# Patient Record
(demographics unavailable — no encounter records)

---

## 2024-10-21 NOTE — ASSESSMENT
[FreeTextEntry1] : 58 y/o M w/ PMH of R hernia repair, TIA, and cataract surgery presenting for NPA after recent ED visit at [facility].  #C/f malignancy of sigmoid colon -Referral made to GI -Ensured patient's insurance accepted at provider he was referred to -Will obtain BMP to assess for electrolyte abnormalities associated with diarrhea and vomiting -If patient's colonic thickening is confirmed to be cancer on biopsy, will refer to heme/onc  #Bilateral inguinal hernia -Not incarcerated, but increasingly painful and tender -Will refer to general surgery for repair  #HCM -CBC, CMP, A1C, lipids ordered. Will follow-up -Referred to GI for colonoscopy -Further HCM at subsequent visits -Return to clinic in 5 weeks

## 2024-10-21 NOTE — ASSESSMENT
[FreeTextEntry1] : 56 y/o M w/ PMH of R hernia repair, TIA, and cataract surgery presenting for NPA after recent ED visit at [facility].  #C/f malignancy of sigmoid colon -Referral made to GI -Ensured patient's insurance accepted at provider he was referred to -Will obtain BMP to assess for electrolyte abnormalities associated with diarrhea and vomiting -If patient's colonic thickening is confirmed to be cancer on biopsy, will refer to heme/onc  #Bilateral inguinal hernia -Not incarcerated, but increasingly painful and tender -Will refer to general surgery for repair  #HCM -CBC, CMP, A1C, lipids ordered. Will follow-up -Referred to GI for colonoscopy -Further HCM at subsequent visits -Return to clinic in 5 weeks

## 2024-10-21 NOTE — PHYSICAL EXAM
[No Acute Distress] : no acute distress [Normal Sclera/Conjunctiva] : normal sclera/conjunctiva [EOMI] : extraocular movements intact [Normal Outer Ear/Nose] : the outer ears and nose were normal in appearance [No Respiratory Distress] : no respiratory distress  [Clear to Auscultation] : lungs were clear to auscultation bilaterally [Normal Rate] : normal rate  [Regular Rhythm] : with a regular rhythm [No Murmur] : no murmur heard [No Edema] : there was no peripheral edema [Grossly Normal Strength/Tone] : grossly normal strength/tone [No Rash] : no rash [Coordination Grossly Intact] : coordination grossly intact [Normal Affect] : the affect was normal [Alert and Oriented x3] : oriented to person, place, and time [de-identified] : Visible R inguinal hernia unable to tolerate reduction due to pain, smaller palpable left inguinal hernia

## 2024-10-21 NOTE — REVIEW OF SYSTEMS
[Night Sweats] : night sweats [Recent Change In Weight] : ~T recent weight change [Cough] : cough [Abdominal Pain] : abdominal pain [Nausea] : nausea [Diarrhea] : diarrhea [Vomiting] : vomiting [Negative] : Heme/Lymph

## 2024-10-21 NOTE — HEALTH RISK ASSESSMENT
[Fair] :  ~his/her~ mood as fair [Yes] : Yes [Monthly or less (1 pt)] : Monthly or less (1 point) [Never (0 pts)] : Never (0 points) [1] : 2) Feeling down, depressed, or hopeless for several days (1) [Never] : Never [With Significant Other] : lives with significant other [Employed] : employed [] :  [PHQ-2 Positive] : PHQ-2 Positive [PHQ-9 Negative - No further assessment needed] : PHQ-9 Negative - No further assessment needed [[id number]] : 2

## 2024-10-21 NOTE — HEALTH RISK ASSESSMENT
[Fair] :  ~his/her~ mood as fair [Yes] : Yes [Monthly or less (1 pt)] : Monthly or less (1 point) [Never (0 pts)] : Never (0 points) [1] : 2) Feeling down, depressed, or hopeless for several days (1) [Never] : Never [With Significant Other] : lives with significant other [Employed] : employed [] :  [PHQ-2 Positive] : PHQ-2 Positive [PHQ-9 Negative - No further assessment needed] : PHQ-9 Negative - No further assessment needed [[id number]] : 2

## 2024-10-21 NOTE — HISTORY OF PRESENT ILLNESS
[FreeTextEntry1] : Bilateral hernias, evaluation for colon cancer [de-identified] : 58 y/o M w/ PMH of R hernia repair, TIA, and cataract surgery presenting for NPA after recent ED visit at [facility]. Seen for diarrhea and cough. Abdominal CT showing thickening of sigmoid colon c/w malignancy. Outpatient referrals made for GI, onc, and gen surg (for bilateral inguinal hernia).  Patient states that over the last 6 months, he has had constant diarrhea. The diarrhea is watery and occurs both at night (wakes him 2-3 times per night) and after eating (approximately 5-10 mins after every meal). He has daily nausea and vomiting (typically 1 episode every morning) and recently had night sweats. He also has a constant cough over the last 3 months that was productive of phlegm initially but is now dry. These symptoms caused him to present to the ED where he was found to have thickening of sigmoid colon, suspicious for malignancy. He now presents to clinic to establish care and obtain referrals to see GI. Patient also has bilateral inguinal hernias R>L, non-incarcerated, but which cause him great discomfort. He has had hernia surgery on the left side. Today, he requests a referral for general surgery for hernia repair.

## 2024-10-21 NOTE — HISTORY OF PRESENT ILLNESS
[FreeTextEntry1] : Bilateral hernias, evaluation for colon cancer [de-identified] : 56 y/o M w/ PMH of R hernia repair, TIA, and cataract surgery presenting for NPA after recent ED visit at [facility]. Seen for diarrhea and cough. Abdominal CT showing thickening of sigmoid colon c/w malignancy. Outpatient referrals made for GI, onc, and gen surg (for bilateral inguinal hernia).  Patient states that over the last 6 months, he has had constant diarrhea. The diarrhea is watery and occurs both at night (wakes him 2-3 times per night) and after eating (approximately 5-10 mins after every meal). He has daily nausea and vomiting (typically 1 episode every morning) and recently had night sweats. He also has a constant cough over the last 3 months that was productive of phlegm initially but is now dry. These symptoms caused him to present to the ED where he was found to have thickening of sigmoid colon, suspicious for malignancy. He now presents to clinic to establish care and obtain referrals to see GI. Patient also has bilateral inguinal hernias R>L, non-incarcerated, but which cause him great discomfort. He has had hernia surgery on the left side. Today, he requests a referral for general surgery for hernia repair.

## 2024-10-21 NOTE — PHYSICAL EXAM
[No Acute Distress] : no acute distress [Normal Sclera/Conjunctiva] : normal sclera/conjunctiva [EOMI] : extraocular movements intact [Normal Outer Ear/Nose] : the outer ears and nose were normal in appearance [No Respiratory Distress] : no respiratory distress  [Clear to Auscultation] : lungs were clear to auscultation bilaterally [Normal Rate] : normal rate  [Regular Rhythm] : with a regular rhythm [No Murmur] : no murmur heard [No Edema] : there was no peripheral edema [Grossly Normal Strength/Tone] : grossly normal strength/tone [No Rash] : no rash [Coordination Grossly Intact] : coordination grossly intact [Normal Affect] : the affect was normal [Alert and Oriented x3] : oriented to person, place, and time [de-identified] : Visible R inguinal hernia unable to tolerate reduction due to pain, smaller palpable left inguinal hernia

## 2024-11-11 NOTE — ASSESSMENT
[FreeTextEntry1] : Mr. Sheldon is a 58yo M with a PMHx of TIA and cataracts and a PSHx of cataract surgery and L inguinal hernia repair (2021). Patient comes to clinic today for evaluation of bilateral inguinal hernias. On exam patient has b/l inguinal hernias that are easily reducible but very tender to palpation. No overlying skin changes and no history of symptoms concerning for obstruction.

## 2024-11-11 NOTE — PHYSICAL EXAM
[Alert] : alert [Oriented to Person] : oriented to person [Oriented to Place] : oriented to place [Oriented to Time] : oriented to time [Calm] : calm [de-identified] : Well nourished, in no acute distress [de-identified] : No increased WOB, no tachypnea  [de-identified] : Soft, nondistended abdomen. Nontender to palpation in b/l upper quadrants. Tender to palpation in b/l lower quadrants.  L groin with well healed surgical incision, palpable inguinal hernia, easily reducible with palpable defect. No swelling or overlying skin changes.  R groin with visible bulge. Hernia is easily reducible with palpable defect. Tender to palpation but no overlying skin changes.

## 2024-11-11 NOTE — PLAN
[FreeTextEntry1] : Plan: Due to findings of irregular sigmoid colon thickening and symptoms of bloody diarrhea and weight loss repair of hernias deferred until after GI workup. Patient advised to follow up with GI on 11/26 for colonoscopy and further evaluation of CT findings prior to hernia repair. Patient declined supportive hernia truss.

## 2024-11-11 NOTE — HISTORY OF PRESENT ILLNESS
[de-identified] : Mr. Sheldon is a 58yo M with a PMHx of TIA and cataracts and a PSHx of cataract surgery and L inguinal hernia repair (2021). Patient comes to clinic today for evaluation of bilateral inguinal hernias. Patient states that 3months ago he began to notice a bulge in his R groin and pain in his R and left groin. For the last 3months pain has been increasing and is now rated a 10/10 in intensity. Pain radiates to b/l lower quadrants. The pain has become very intrusive in his day to day life, he has difficulty walking and states that the hernia increases in size after he eats and has caused him to skip meals occasionally for the last month. He also has had chronic diarrhea for 1mo with occasional dark blood in his stools associated with a 10lb weight loss in the last month. Denies any recent episodes of obstipation, generalized abdominal pain, skin changes over the hernias, or vomiting.   Of note, patient was recently seen in the [facility] ED for persistent cough, diarrhea and evaluatoin of the R inguinal hernia. During this ED visit CT abdomen was performed which showed irregular wall thickening of the distal sigmoid colon, suspicious for a neoplasm. Patient has follow up with GI on 11/26 for further evaluation.

## 2024-11-11 NOTE — END OF VISIT
[] : Resident [FreeTextEntry3] : I have seen and examined this patient with the resident housestaff. I have reviewed all labs, imaging and reports. I have participated in formulating the plan, and have read and agree with the history, ROS, exam, assessment and plan as stated above.    58 yo M with symptomatic bilateral inguinal hernias, easily reducible. Patient's history and imaging are concerning for possible colon malignancy. He will see GI on 11/26. Recommend delaying surgical repair of hernias until malignancy work up complete.      Total time spent in the care of this patient today (excluding critical care, teaching, & procedures): 30 minutes                  Over 50% of the total time was spent on counseling and coordination of care.

## 2024-11-11 NOTE — REASON FOR VISIT
[Initial Eval - Existing Diagnosis] : an initial evaluation of an existing diagnosis [Spouse] : spouse [FreeTextEntry1] :  Initial evaluation for bilateral inguinal hernias

## 2024-12-02 NOTE — END OF VISIT
[] : Fellow [FreeTextEntry3] : As modified and discussed with patient MD SLAVA Garcia [facility] Associate Professor of Medicine [facility]   [Time Spent: ___ minutes] : I have spent [unfilled] minutes of time on the encounter which excludes teaching and separately reported services.

## 2024-12-02 NOTE — PHYSICAL EXAM
[Normal] : heart rate was normal and rhythm regular, normal S1 and S2, no murmurs [No Masses] : no abdominal mass palpated [Abdomen Soft] : soft [Abnormal Walk] : normal gait [No Clubbing, Cyanosis] : no clubbing or cyanosis of the fingernails [No Joint Swelling] : no joint swelling seen [Normal Color / Pigmentation] : normal skin color and pigmentation [] : no rash [Oriented To Time, Place, And Person] : oriented to person, place, and time [Normal Affect] : the affect was normal [de-identified] : Diffuse abdominal tenderness [de-identified] : Performed with Dr. Bazzi in room; (+) skin tag, no active bleeding. Brown stool in vault

## 2024-12-02 NOTE — PHYSICAL EXAM
[Normal] : heart rate was normal and rhythm regular, normal S1 and S2, no murmurs [No Masses] : no abdominal mass palpated [Abdomen Soft] : soft [Abnormal Walk] : normal gait [No Clubbing, Cyanosis] : no clubbing or cyanosis of the fingernails [No Joint Swelling] : no joint swelling seen [Normal Color / Pigmentation] : normal skin color and pigmentation [] : no rash [Oriented To Time, Place, And Person] : oriented to person, place, and time [Normal Affect] : the affect was normal [de-identified] : Diffuse abdominal tenderness [de-identified] : Performed with Dr. Bazzi in room; (+) skin tag, no active bleeding. Brown stool in vault

## 2024-12-02 NOTE — REVIEW OF SYSTEMS
[Recent Weight Loss (___ Lbs)] : recent [unfilled] ~Ulb weight loss [As Noted in HPI] : as noted in HPI [Itching] : itching [Negative] : Neurological [Fever] : no fever [Chills] : no chills [Eye Pain] : no eye pain [Arthralgias (joint pain)] : no arthralgias [Joint Swelling] : no joint swelling [Limb Swelling] : no limb swelling [Skin Lesions] : no skin lesions [de-identified] : LE itching

## 2024-12-02 NOTE — END OF VISIT
Rosario Alford  : 1968 [facility] [address]  [phone]   [phone]       OUTPATIENT PHYSICAL THERAPY: Daily Note 2017    ICD-10: Treatment Diagnosis: Cervicalgia (M54.2)   Precautions/Allergies:   Review of patient's allergies indicates no known allergies. Fall Risk Score: 0 (? 5 = High Risk)  MD Orders: Eval and Treat  MEDICAL/REFERRING DIAGNOSIS:  Other cervical disc degeneration, unspecified cervical region [M50.30]  Radiculopathy, cervical region [M54.12]   DATE OF ONSET: May 10, 2017  REFERRING PHYSICIAN: Irving Hampton MD  RETURN PHYSICIAN APPOINTMENT: TBD by patient      INITIAL ASSESSMENT:  Ms. Rosario Alford has attended 1 physical therapy session including initial evaluation. Rosario Alford presents with S/S of increased pain, decreased ROM, decreased strength, decreased functional tolerance consistent with S/S of median nerve radiculopathy. Rosario Alford will benefit from home exercise program, therapeutic and postural strengthening exercises, manual therapeutic techniques (ie. Distraction, SOR, myofascial release/soft tissue mobilization) as appropriate to address Osvaldo Ornelas's current condition. Rosario Alford will benefit from skilled PT (medically necessary) to address above deficits affecting participation in basic ADLs and overall functional tolerance. PROBLEM LIST (Impacting functional limitations):  1. Decreased Strength  2. Decreased ADL/Functional Activities  3. Decreased Transfer Abilities  4. Decreased Ambulation Ability/Technique  5. Decreased Balance  6. Increased Pain  7. Decreased Work Simplification/Energy Conservation Techniques  8. Decreased Flexibility/Joint Mobility  9. Decreased [place] with Home Exercise Program INTERVENTIONS PLANNED:  1. Balance Exercise  2. Bed Mobility  3. Cold  4. Cryotherapy  5. Heat  6. Home Exercise Program (HEP)  7. Manual Therapy  8.  Neuromuscular Re-education/Strengthening  9. Range of Motion (ROM)  10. Therapeutic Activites  11. Therapeutic Exercise/Strengthening  12. Transfer Training   TREATMENT PLAN:  Effective Dates: 7/17/2017 TO 10/17/2017. Frequency/Duration: 2 times a week for 8 weeks  GOALS: (Goals have been discussed and agreed upon with patient.)  SHORT-TERM FUNCTIONAL GOALS: Time Frame: 4 weeks  1. Russell Lindo will report <=5/10 pain to cervical spine with performance of functional spinal mobility and rotation. 2.  Russell Lindo will demonstrate improved NDI score, indicating spinal improvement from 35/50 to 25/50 affecting minimal to no difficulty with performance of cervical mobility and strengthening. 3.  Gailamador Chavezer to be independent with initial HEP for cervical region and UE's.   4.  Gail Prymer will improve ROM to >=90% to assist with improved function during instrumental activities of daily living. 5.  Russell Lindo will improve MMT to >=4+/5 to all UE strengthening to return to PLOF and improve functional tolerance. DISCHARGE GOALS: Time Frame: 8 weeks  1. Russell Lindo will report <=2/10 pain to cervical spine with performance of functional spinal mobility and rotation and minimal to no difficulty with such tasks. 2. Russell Lindo will demonstrate improved NDI score, indicating spinal improvement from 25/50 to 15/50 affecting minimal to no difficulty with performance of cervical mobility and strengthening. 3. Gail Prymer to be independent with advanced HEP for cervical region and UE's. Rehabilitation Potential For Stated Goals: Good  Regarding Gailamador Ornelas's therapy, I certify that the treatment plan above will be carried out by a therapist or under their direction.   Thank you for this referral,  Augustine Tee PT     Referring Physician Signature: MariaG Wright MD              Date                    HISTORY:   History of Present Injury/Illness (Reason for Referral):  Ms. Carolyn Botello reports onset for left [] : Fellow [FreeTextEntry3] : As modified and discussed with patient MD SLAVA Garcia [facility] Associate Professor of Medicine [facility]   sided neck and arm pain that has disturbed sleep, bathing, dressing, job duties, housework, cooking, eating, sitting, and driving. She reports that her symptoms are occasional sharp, throbbing, twinge, ache, numbness (in the middle three fingers on left hand), tingling (occasional), tight, pulling. Past Medical History/Comorbidities:   Ms. Gwendolyn Dowell  has a past medical history of Anxiety; Arthritis; Chronic obstructive pulmonary disease ([facility]); Chronic pain; Contact dermatitis and other eczema, due to unspecified cause; Diabetes ([facility]); Hypercholesterolemia; Hypertension; and Trauma. Ms. Gwendolyn Dowell  has a past surgical history that includes  section and myomectomy. Social History/Living Environment:     lives alone in an apartment  Prior Level of Function/Work/Activity:    Personal Factors:          Social Background:  Recently moved from another part of the [place]    Current Medications:    Current Outpatient Prescriptions:     fluconazole (DIFLUCAN) 100 mg tablet, One a day for 5 days for yeast infection, Disp: 5 Tab, Rfl: 0    aspirin delayed-release 81 mg tablet, Take  by mouth daily. , Disp: , Rfl:     atenolol (TENORMIN) 25 mg tablet, Take 25 mg by mouth daily. , Disp: , Rfl:     gabapentin (NEURONTIN) 100 mg capsule, One capsule three times a day for nerve pain, Disp: 90 Cap, Rfl: 1    doxepin (SINEQUAN) 10 mg capsule, Take 1 Cap by mouth nightly., Disp: 30 Cap, Rfl: 0    simvastatin (ZOCOR) 20 mg tablet, Take 1 Tab by mouth nightly., Disp: 90 Tab, Rfl: 3    meloxicam (MOBIC) 15 mg tablet, Take 1 Tab by mouth daily. , Disp: 30 Tab, Rfl: 6     Date Last Reviewed:  2017   Number of Personal Factors/Comorbidities that affect the Plan of Care: 1-2: MODERATE COMPLEXITY   EXAMINATION:   Observation/Orthostatic Postural Assessment: Forward head posture, guarded left arm position, increased thoracic kyphosis. Increased mid cervical spine motion with mid-cervical hinging.   MRI- [Time Spent: ___ minutes] : I have spent [unfilled] minutes of time on the encounter which excludes teaching and separately reported services. left C6-7 disc bulge  Palpation:          Tenderness and decreased soft tissue mobility in bilateral upper trapezius, levator, and cervical paraspinals. Limited thoracic spine mobility, increased thoracic spine paraspinal mobility with tenderness throughout. ROM:    Joint: Eval Date: 7/17/2017 Re-Assess Date: Re-Assess Date:         Cervical AROM      Flexion (% of normal): 75     Extension (% of normal): 50     L sidebending (% of normal): 100     R sidebending (% of normal): 75     L rotation (% of normal): 100     R rotation (% of normal): 75                 Pain at end-range or with AROM? Yes/No Yes with right sidebending and rotation     Any pain with overpressure? Yes/No Yes on right spurlings       Strength:    Joint: Eval Date:  Re-Assess Date:  Re-Assess Date:     RIGHT LEFT RIGHT LEFT RIGHT LEFT   Shoulder flexion:  5/5 5/5       Shoulder extension: 5/5 5/5       Shoulder abduction: 5/5 5/5       Shoulder IR: 5/5 5/5       Shoulder ER: 5/5 5/5       Elbow flexion: 5/5 5/5       Elbow extension: 5/5 5/5       Wrist flexion/extension: 5/5 5/5           Special Tests: Assessed @ Initial Visit    Spurling's Test: Positive on right with left pain     Neurological Screen: Radiating symptoms? Yes on left  +left median nerve testing. Functional Mobility:  Assessed @ Initial Visit:  Impaired movement pattern supine-sit     Body Structures Involved:  1. Nerves  2. Thoracic Cage  3. Bones  4. Joints  5. Muscles  6. Ligaments Body Functions Affected:  1. Sensory/Pain  2. Neuromusculoskeletal  3. Movement Related Activities and Participation Affected:  1. General Tasks and Demands  2. Mobility  3. Self Care  4. Community, Social and [place]   Number of elements that affect the Plan of Care: 3: MODERATE COMPLEXITY   CLINICAL PRESENTATION:   Presentation: Stable and uncomplicated: LOW COMPLEXITY   CLINICAL DECISION MAKING:   Outcome Measure:    Tool Used: Neck Disability Index (NDI)  Score:  Initial: 35/50  Most Recent: X/50 (Date: -- )   Interpretation of Score: The Neck Disability Index is a revised form of the Oswestry Low Back Pain Index and is designed to measure the activities of daily living in person's with neck pain. Each section is scored on a 0-5 scale, 5 representing the greatest disability. The scores of each section are added together for a total score of 50. Score 0 1-10 11-20 21-30 31-40 41-49 50   Modifier CH CI CJ CK CL CM CN       Medical Necessity:   · Skilled intervention continues to be required due to address above deficits affecting participation in basic ADLs and overall functional tolerance. Reason for Services/Other Comments:  · Patient continues to require skilled intervention due to address above deficits affecting participation in basic ADLs and overall functional tolerance. Use of outcome tool(s) and clinical judgement create a POC that gives a: Questionable prediction of patient's progress: MODERATE COMPLEXITY   TREATMENT:   (In addition to Assessment/Re-Assessment sessions the following treatments were rendered)  THERAPEUTIC EXERCISE: (25 minutes):  Exercises per grid below to improve mobility, strength, balance and coordination. Required minimal visual and verbal cues to promote proper body alignment and promote proper body posture. Progressed resistance, range, repetitions and complexity of movement as indicated.      Date:  7/17/2017 Date:  7/24/2017 Date:  7/26/2017 Date:  8/1/2017 Date:  8/3/2017 Date:  8/9/2017 Date:  8/14/2017   Activity/Exercise Parameters Parameters Parameters       Sidelying trunk rotation 2x10 2x10        Deep cervcial chin tucks 10x5\" 10x5\"     10x5\"   Doorway pec stretch 3x30\" 3x30\" 3x30\"  3x30\" 3x30\" 3x30\"   Median nerve glides 2x20 2x20        Nu-step  x10' x10' x10'      Cervical joint position training   With multi direction control with visual bio-feedback x10' With multi direction control with visual bio-feedback x10'  -JPET in horizontal plane  x5'  With multi direction control with visual bio-feedback x10'  While standing on compliant surface With multi direction control with visual bio-feedback x10'  While standing on compliant surface   TB  ER     Yellow 2x15  Red 3x10   Left UE distraction     3x1' with overhead flexion     TB rows       Blue 3x10   Tb extensions       Green 3x10   UBE       x6'                                       MANUAL THERAPY: (0 minutes): Joint mobilization, Soft tissue mobilization and Manipulation was utilized and necessary because of the patient's restricted joint motion, painful spasm and loss of articular motion. MODALITIES: (15 minutes):  Mechanical cervical traction in supine hooklyin# max tension, 8# least tension. No adverse reaction with treatment. (Used abbreviations: MET - muscle energy technique; PNF - proprioceptive neuromuscular facilitation; NMR - neuromuscular re-education; AP - anterior to posterior; PA - posterior to anterior)    Pre-Treatment Assessment: Reports feeling better after last visit, with on/off symptoms. Treatment/Session Assessment: Patient had improved ROM and decreased to minimal tingling after treatment. Patient reports that she feels much better overall. · Pain/ Symptoms: Initial:   4 Post Session:  2 ·   Compliance with Program/Exercises: Will assess as treatment progresses. · Recommendations/Intent for next treatment session: \"Next visit will focus on advancements to more challenging activities and reduction in assistance provided\".   Total Treatment Duration:  PT Patient Time In/Time Out  Time In: 1300  Time Out: [place], PT

## 2024-12-02 NOTE — ASSESSMENT
[FreeTextEntry1] : 58 y/o M w/ PMH of R hernia repair, TIA, and cataract surgery presented for chronic abdominal pain, diarrhea and hematochezia with recent CT A/P finding of sigmoid thickening.  Assessment #Abdominal pain #Diarrhea #Hematochezia #Sigmoid thickening on CT - DDx: IBD vs. colon cancer vs. infectious colitis - Unclear family hx, no prior colonoscopy  Plan: - Will send GI PCR and c. diff to r/o infection - Will repeat CBC with iron panel/ferritin given hematochezia - Will send serum CRP and fecal calprotectin to assess for IBD - Once r/o infection, will plan for colonoscopy to evaluate for diarrhea, hematochezia and abnormal CT finding - Risks and benefits of colonoscopy including but not limited to bleeding, infection, missed lesions, perforation, injury to internal organs, anesthesia/drug side effects were discussed with the patient. Instructions for colon prep and the day of procedure were given to the patient. All questions were answered. Patient is agreeable to the procedure. Pending scheduling. - Bowel prep sent to pharmacy  D/w Dr. Jluis Hendrickson, PGY-6

## 2024-12-02 NOTE — REVIEW OF SYSTEMS
[Recent Weight Loss (___ Lbs)] : recent [unfilled] ~Ulb weight loss [As Noted in HPI] : as noted in HPI [Itching] : itching [Negative] : Neurological [Fever] : no fever [Chills] : no chills [Eye Pain] : no eye pain [Arthralgias (joint pain)] : no arthralgias [Joint Swelling] : no joint swelling [Limb Swelling] : no limb swelling [Skin Lesions] : no skin lesions [de-identified] : LE itching

## 2024-12-02 NOTE — HISTORY OF PRESENT ILLNESS
[FreeTextEntry1] : 58 y/o M w/ PMH of R hernia repair, TIA, and cataract surgery presented for chronic abdominal pain, diarrhea and hematochezia with recent CT A/P finding of sigmoid thickening. Patient reported started to have diffuse mid-abdominal pain about 1 year ago, associated with constipation. Patient was referred to GI at that time, but didn't want colonoscopy.  Patient reported abdominal pain getting worse and with increased gas/bloating. He developed watery diarrhea about 6 months ago, with increased frequency overnight. He would wake up 5-6 times at night with urge and loose stool, as well as up to 4x in an hour during the day with rectal pain, bowel urge and small amount of loose stool. About 1 month ago, patient developed hematochezia, 1-2 times a week with BM's.  Patient stated that the abdominal pain may get worse after eating but was not associated with particular food. He denied smoking, ETOH use. No prior EGD/colonoscopy.  Patient is unaware of his family hx as parents passed away in MVA when he was 13, no siblings and no children (). He is not close to his other relatives.  Patient takes ASA and atorvastatin since the TIA (2 days after receiving 2nd dose of Moderna, no prior COVID infection), no other medications.  Patient reported to have 10-lb weight loss in the last month, but otherwise denied fever, chills, eye pain, visual changes, rashes, or joint pain. Patient also denied chest pain, shortness of breath, dysphagia, odynophagia, nausea, vomiting, decreased appetite, early satiety, melena or hematemesis.

## 2024-12-02 NOTE — ASSESSMENT
[FreeTextEntry1] : 56 y/o M w/ PMH of R hernia repair, TIA, and cataract surgery presented for chronic abdominal pain, diarrhea and hematochezia with recent CT A/P finding of sigmoid thickening.  Assessment #Abdominal pain #Diarrhea #Hematochezia #Sigmoid thickening on CT - DDx: IBD vs. colon cancer vs. infectious colitis - Unclear family hx, no prior colonoscopy  Plan: - Will send GI PCR and c. diff to r/o infection - Will repeat CBC with iron panel/ferritin given hematochezia - Will send serum CRP and fecal calprotectin to assess for IBD - Once r/o infection, will plan for colonoscopy to evaluate for diarrhea, hematochezia and abnormal CT finding - Risks and benefits of colonoscopy including but not limited to bleeding, infection, missed lesions, perforation, injury to internal organs, anesthesia/drug side effects were discussed with the patient. Instructions for colon prep and the day of procedure were given to the patient. All questions were answered. Patient is agreeable to the procedure. Pending scheduling. - Bowel prep sent to pharmacy  D/w Dr. Jluis Hendrickson, PGY-6